# Patient Record
Sex: FEMALE
[De-identification: names, ages, dates, MRNs, and addresses within clinical notes are randomized per-mention and may not be internally consistent; named-entity substitution may affect disease eponyms.]

---

## 2024-05-08 ENCOUNTER — NON-APPOINTMENT (OUTPATIENT)
Age: 59
End: 2024-05-08

## 2024-05-08 DIAGNOSIS — B35.1 TINEA UNGUIUM: ICD-10-CM

## 2024-05-08 DIAGNOSIS — E11.9 TYPE 2 DIABETES MELLITUS W/OUT COMPLICATIONS: ICD-10-CM

## 2024-05-08 PROBLEM — Z00.00 ENCOUNTER FOR PREVENTIVE HEALTH EXAMINATION: Status: ACTIVE | Noted: 2024-05-08

## 2024-09-12 ENCOUNTER — APPOINTMENT (OUTPATIENT)
Dept: PODIATRY | Facility: CLINIC | Age: 59
End: 2024-09-12
Payer: COMMERCIAL

## 2024-09-12 VITALS — HEIGHT: 68 IN | WEIGHT: 276 LBS | BODY MASS INDEX: 41.83 KG/M2

## 2024-09-12 DIAGNOSIS — L60.0 INGROWING NAIL: ICD-10-CM

## 2024-09-12 DIAGNOSIS — M79.671 PAIN IN RIGHT FOOT: ICD-10-CM

## 2024-09-12 DIAGNOSIS — B07.8 OTHER VIRAL WARTS: ICD-10-CM

## 2024-09-12 PROCEDURE — 17110 DESTRUCTION B9 LES UP TO 14: CPT

## 2024-09-12 PROCEDURE — 99204 OFFICE O/P NEW MOD 45 MIN: CPT | Mod: 25

## 2024-09-16 PROBLEM — B07.8 VERRUCA PLANA: Status: ACTIVE | Noted: 2024-09-16

## 2024-09-16 PROBLEM — M79.671 RIGHT FOOT PAIN: Status: ACTIVE | Noted: 2024-09-16

## 2024-09-16 PROBLEM — L60.0 INGROWN RIGHT BIG TOENAIL: Status: ACTIVE | Noted: 2024-09-16

## 2024-09-16 PROBLEM — L60.0 INGROWN LEFT BIG TOENAIL: Status: ACTIVE | Noted: 2024-09-16

## 2024-09-16 NOTE — PHYSICAL EXAM
[TextEntry] : The medial and lateral borders of both hallux nails are incurvated.  The nail folds are slightly swollen.  Pain upon direct palpation of both nail plates is present.   Lateral to the fifth metatarsal head on the left foot, there is a punctate white verrucoid -type lesion.  The area is sensitive to the touch and painful to direct palpation.

## 2024-09-16 NOTE — HISTORY OF PRESENT ILLNESS
[FreeTextEntry1] : She returns with two complaints.  First, she states her ingrown toenails are bothering her.   She states that it has been a while since they have been cut and she wanted to have them looked at.    Secondly, she states that she noticed a painful callus on the outside of her left foot.  She states that it has been hurting.  She covered it with moleskin, which helps a little, but she wanted to have it looked at.

## 2024-09-16 NOTE — ASSESSMENT
[FreeTextEntry1] : Assessment: Ingrown toenail, medial and lateral border of left and right hallux, Verruca plantaris, one lesion, left foot.  Plan: I performed straightback procedures utilizing a 12.7 cm sterile box lock, double spring fine cut back D tip fine tip stainless steel nail splitter removing the medial and lateral borders of both the left and right hallux nails as far back as tolerable and applied Amerigel wound gel with sterile compression dressings. The patient was instructed to start soaking the feet in lukewarm water and Epsom salts, 2 tablespoons per pint for 20 minutes twice per day.  The patient was advised to dry the feet with a clean towel and then apply a sterile dressing to the area for the next 5 days.  I discussed various treatment options including surgical removal of the wart(s) and the patient opted for conservative treatment at this time.  I then performed aseptic debridement of all the verrucous tissue followed by application of Trichloracetic acid for chemical destruction of the warts. I dispensed to the patient VerruStat topical solution Salicylic Acid 17% in a vehicle containing Retinyl Palmitate and penetration-enhancer MSM Unique wart removing formulation with added Vitamin A derivative, Retinyl Palmitate, and permeation-enhancing agents.  VerruStat has been specially formulated for the topical management and removal of clinically common and palmoplantar warts. VerruStatTM is dispensed only by physicians. The patient was given instructions to apply the medication to the wart(s) 2x a day. I explained that it had a film that builds up and can be filed by the patient, but not picked at.  No covering would be necessary. The patient was advised not to walk barefoot.  I advised the patient that warts were contagious and discussed methods of avoiding the spreading of warts.  Patient is to return in 2 weeks for follow-up.  PTR:  4 weeks.

## 2024-11-04 ENCOUNTER — APPOINTMENT (OUTPATIENT)
Dept: PODIATRY | Facility: CLINIC | Age: 59
End: 2024-11-04
Payer: COMMERCIAL

## 2024-11-04 DIAGNOSIS — L60.0 INGROWING NAIL: ICD-10-CM

## 2024-11-04 DIAGNOSIS — L08.9 LOCAL INFECTION OF THE SKIN AND SUBCUTANEOUS TISSUE, UNSPECIFIED: ICD-10-CM

## 2024-11-04 DIAGNOSIS — M79.2 NEURALGIA AND NEURITIS, UNSPECIFIED: ICD-10-CM

## 2024-11-04 PROCEDURE — 99214 OFFICE O/P EST MOD 30 MIN: CPT

## 2024-11-06 PROBLEM — L08.9 INFECTION OF LEFT FOOT: Status: ACTIVE | Noted: 2024-11-06

## 2024-11-14 ENCOUNTER — APPOINTMENT (OUTPATIENT)
Dept: NEUROLOGY | Facility: CLINIC | Age: 59
End: 2024-11-14

## 2025-03-13 ENCOUNTER — APPOINTMENT (OUTPATIENT)
Dept: PODIATRY | Facility: CLINIC | Age: 60
End: 2025-03-13
Payer: COMMERCIAL

## 2025-03-13 DIAGNOSIS — M79.674 PAIN IN RIGHT TOE(S): ICD-10-CM

## 2025-03-13 DIAGNOSIS — L60.0 INGROWING NAIL: ICD-10-CM

## 2025-03-13 DIAGNOSIS — E11.9 TYPE 2 DIABETES MELLITUS W/OUT COMPLICATIONS: ICD-10-CM

## 2025-03-13 DIAGNOSIS — M79.675 PAIN IN LEFT TOE(S): ICD-10-CM

## 2025-03-13 PROCEDURE — 99214 OFFICE O/P EST MOD 30 MIN: CPT

## 2025-03-18 PROBLEM — M79.674 PAIN OF TOE OF RIGHT FOOT: Status: ACTIVE | Noted: 2025-03-18

## 2025-03-18 PROBLEM — M79.675 PAIN OF TOE OF LEFT FOOT: Status: ACTIVE | Noted: 2025-03-18

## 2025-06-20 ENCOUNTER — APPOINTMENT (OUTPATIENT)
Dept: PODIATRY | Facility: CLINIC | Age: 60
End: 2025-06-20
Payer: COMMERCIAL

## 2025-06-20 PROCEDURE — 99214 OFFICE O/P EST MOD 30 MIN: CPT

## 2025-08-27 ENCOUNTER — APPOINTMENT (OUTPATIENT)
Dept: PODIATRY | Facility: CLINIC | Age: 60
End: 2025-08-27
Payer: COMMERCIAL

## 2025-08-27 DIAGNOSIS — L60.0 INGROWING NAIL: ICD-10-CM

## 2025-08-27 DIAGNOSIS — M79.675 PAIN IN LEFT TOE(S): ICD-10-CM

## 2025-08-27 DIAGNOSIS — M79.674 PAIN IN RIGHT TOE(S): ICD-10-CM

## 2025-08-27 DIAGNOSIS — E11.9 TYPE 2 DIABETES MELLITUS W/OUT COMPLICATIONS: ICD-10-CM

## 2025-08-27 PROCEDURE — 99214 OFFICE O/P EST MOD 30 MIN: CPT
